# Patient Record
Sex: FEMALE | URBAN - NONMETROPOLITAN AREA
[De-identification: names, ages, dates, MRNs, and addresses within clinical notes are randomized per-mention and may not be internally consistent; named-entity substitution may affect disease eponyms.]

---

## 2023-06-26 ENCOUNTER — APPOINTMENT (RX ONLY)
Dept: URBAN - NONMETROPOLITAN AREA CLINIC 31 | Facility: CLINIC | Age: 43
Setting detail: DERMATOLOGY
End: 2023-06-26

## 2023-06-26 DIAGNOSIS — A60.9 ANOGENITAL HERPESVIRAL INFECTION, UNSPECIFIED: ICD-10-CM | Status: INADEQUATELY CONTROLLED

## 2023-06-26 PROBLEM — L08.9 LOCAL INFECTION OF THE SKIN AND SUBCUTANEOUS TISSUE, UNSPECIFIED: Status: ACTIVE | Noted: 2023-06-26

## 2023-06-26 PROCEDURE — ? PRESCRIPTION

## 2023-06-26 PROCEDURE — ? COUNSELING

## 2023-06-26 PROCEDURE — ? ORDER TESTS

## 2023-06-26 PROCEDURE — ? PRESCRIPTION MEDICATION MANAGEMENT

## 2023-06-26 PROCEDURE — 99204 OFFICE O/P NEW MOD 45 MIN: CPT

## 2023-06-26 RX ORDER — SILVER SULFADIAZINE 10 MG/G
CREAM TOPICAL
Qty: 50 | Refills: 0 | Status: ERX | COMMUNITY
Start: 2023-06-26

## 2023-06-26 RX ADMIN — SILVER SULFADIAZINE: 10 CREAM TOPICAL at 00:00

## 2023-06-26 ASSESSMENT — LOCATION DETAILED DESCRIPTION DERM
LOCATION DETAILED: LEFT LABIUM MAJUS
LOCATION DETAILED: RIGHT LABIUM MINUS

## 2023-06-26 ASSESSMENT — LOCATION ZONE DERM: LOCATION ZONE: VULVA

## 2023-06-26 ASSESSMENT — LOCATION SIMPLE DESCRIPTION DERM
LOCATION SIMPLE: LABIA MINORA
LOCATION SIMPLE: LABIA MAJORA

## 2023-06-26 NOTE — PROCEDURE: COUNSELING
Detail Level: Detailed
Patient Specific Counseling (Will Not Stick From Patient To Patient): GENITAL CULTURE ALSO COLLECTED AS PT NOTES ODOR PRESENT, MAY HAVE COEXISTING VAGINAL INFECTION

## 2023-06-26 NOTE — PROCEDURE: PRESCRIPTION MEDICATION MANAGEMENT
Plan: PT TO START RX, VALTREX 1 G TID X 7 D, PROVIDED FROM [facility], MAY USE TOPICAL SILVADENE TO AA AS DIRECTED, KEEP AA OTHERWISE CLEAN AND DRY WITH GENTLE SOAP AND  WATER, AVOID SEXUAL CONTACT UNTIL LESIONS ARE COMPLETELY HEALED, WILL CONTACT PT WITH LAB RESULTS WHEN AVAILABLE
Render In Strict Bullet Format?: No
Detail Level: Zone

## 2023-06-26 NOTE — PROCEDURE: ORDER TESTS
Billing Type: Third-Party Bill
Expected Date Of Service: 06/26/2023
Bill For Surgical Tray: no
Performing Laboratory: 0